# Patient Record
Sex: MALE | Race: WHITE | NOT HISPANIC OR LATINO | Employment: FULL TIME | URBAN - NONMETROPOLITAN AREA
[De-identification: names, ages, dates, MRNs, and addresses within clinical notes are randomized per-mention and may not be internally consistent; named-entity substitution may affect disease eponyms.]

---

## 2018-07-25 ENCOUNTER — OFFICE VISIT (OUTPATIENT)
Dept: OTOLARYNGOLOGY | Facility: CLINIC | Age: 33
End: 2018-07-25

## 2018-07-25 VITALS — WEIGHT: 159 LBS | TEMPERATURE: 96.4 F | BODY MASS INDEX: 22.26 KG/M2 | HEIGHT: 71 IN

## 2018-07-25 DIAGNOSIS — R22.1 MASS OF RIGHT SIDE OF NECK: Primary | ICD-10-CM

## 2018-07-25 DIAGNOSIS — J37.0 CHRONIC LARYNGITIS: ICD-10-CM

## 2018-07-25 PROCEDURE — 31575 DIAGNOSTIC LARYNGOSCOPY: CPT | Performed by: OTOLARYNGOLOGY

## 2018-07-25 PROCEDURE — 99243 OFF/OP CNSLTJ NEW/EST LOW 30: CPT | Performed by: OTOLARYNGOLOGY

## 2018-07-25 RX ORDER — CHLORTHALIDONE 25 MG/1
25 TABLET ORAL
COMMUNITY
Start: 2017-08-01 | End: 2018-08-02

## 2018-07-25 RX ORDER — LISINOPRIL 5 MG/1
5 TABLET ORAL
COMMUNITY
Start: 2017-08-14 | End: 2018-08-15

## 2018-07-25 NOTE — PROGRESS NOTES
"Subjective   Regan Arthur is a 33 y.o. male.  This is a consultation from Dr. Dwight Lemus     History of Present Illness   Patient reports he has felt a mass in his neckon the right side for at least 4 months.  Nothing in particular brought it on.  He cannot recall any inciting events such as an upper respiratory infection or dental infection.  He states that the mass has increased somewhat in size since he originally noticed it.  It is not painful.  No dysphagia.  No voice change.  No hemoptysis.  He has never had anything like this before.  Denies fevers, chills, night sweats, or unexpected weight loss.  Has never been a tobacco user.  Underwent ultrasounds at [facility] first on 4/26/18 which revealed a 1.9 x 1.4 x 1.0 cm mass and a second 1.1 cm in greatest diameter mass.  Repeat ultrasound performed on 6/15/18 showed resolution of the smaller mass and reportedly \"the larger of the 2 lymph nodes is about the same size as earlier\"       The following portions of the patient's history were reviewed and updated as appropriate: allergies, current medications, past family history, past medical history, past social history, past surgical history and problem list.      Regan Arthur reports that he has never smoked. He has never used smokeless tobacco.  Patient is not a tobacco user and has not been counseled for use of tobacco products    Family History   Problem Relation Age of Onset   • Cancer Mother        No Known Allergies      Current Outpatient Prescriptions:   •  chlorthalidone (HYGROTON) 25 MG tablet, Take 25 mg by mouth., Disp: , Rfl:   •  lisinopril (PRINIVIL,ZESTRIL) 5 MG tablet, Take 5 mg by mouth., Disp: , Rfl:     Past Medical History:   Diagnosis Date   • High blood pressure          Review of Systems   Constitutional: Negative for chills, fever and unexpected weight change.   HENT: Negative for trouble swallowing.    Respiratory: Negative for cough.            Objective   Physical " Exam  General: Well-developed well-nourished male in no acute distress.  Alert and oriented ×3. Head: Normocephalic. Face: Symmetrical strength and appearance. PERRL. EOMI. Voice:Strong. Speech:Fluent  Ears: External ears no deformity, canals show cerumen impactions that is not addressed today.  Tympanic membranes are not visualized  Nose: Nares show no discharge mass polyp or purulence.  Boggy mucosa is present.  No gross external deformity.  Septum: To the right  Oral cavity: Lips and gums without lesions.  Tongue and floor of mouth without lesions.  Parotid and submandibular ducts unobstructed.  No mucosal lesions on the buccal mucosa or vestibule of the mouth.  Pharynx: No erythema exudate mass or ulcer.  Tonsils appear atrophic  Neck: Mass in the right upper neck at the angle of the mandible that is well circumscribed measuring 2.3 cm in greatest diameter.  It is mobile and not fixed.  Slightly rubbery consistency..  No thyromegaly.  Trachea and larynx midline.  No masses in the parotid or submandibular glands.    Procedure Note    Pre-operative Diagnosis: Patient presents with:  Swollen Glands: right neck swollen gland       Post-operative Diagnosis: Chronic laryngitis    Anesthesia: topical with xylocaine and neosynephrine    Endoscopy Type:  Flexible Laryngoscopy    Procedure Details:    The patient was placed in the sitting position.  After topical anesthesia and decongestion, the 4 mm laryngoscope was passed.  The nasal cavities, nasopharynx, oropharynx, hypopharynx, and larynx were all examined.  Vocal cords were examined during respiration and phonation.  The following findings were noted:    Findings: Previously noted nasal findings were confirmed. Nasopharynx without mass, hypopharynx and larynx without evidence of neoplasm. Vocal cord mobility intact. There is chronic appearing edema and erythema of the laryngeal structures consistent with chronic laryngitis.    Condition:  Stable.  Patient tolerated  procedure well.    Complications:  NoneProcedure Note    Pre-operative Diagnosis: Patient presents with:  Swollen Glands: right neck swollen gland       Post-operative Diagnosis: same    Anesthesia: topical with xylocaine and neosynephrine    Endoscopy Type:  Flexible Laryngoscopy    Procedure Details:    The patient was placed in the sitting position.  After topical anesthesia and decongestion, the 4 mm laryngoscope was passed.  The nasal cavities, nasopharynx, oropharynx, hypopharynx, and larynx were all examined.  Vocal cords were examined during respiration and phonation.  The following findings were noted:    Findings: Previously noted nasal findings were confirmed. Nasopharynx without mass, hypopharynx and larynx without evidence of neoplasm. Vocal cord mobility intact. There is chronic appearing edema and erythema of the laryngeal structures consistent with chronic laryngitis.    Condition:  Stable.  Patient tolerated procedure well.    Complications:  None    Assessment/Plan   Regan was seen today for swollen glands.    Diagnoses and all orders for this visit:    Mass of right side of neck    Chronic laryngitis       plan: We will obtain CT scan of the neck to further delineate the anatomy of the mass and its association to the surrounding structures in particular I'm interested to see if this could possibly be arising out of the inferior aspect of the parotid.  We will consider obtaining fine-needle aspiration biopsy upon return after the scan.    My thanks to Dr. Lemus for this consultation

## 2018-08-06 ENCOUNTER — HOSPITAL ENCOUNTER (OUTPATIENT)
Dept: CT IMAGING | Facility: HOSPITAL | Age: 33
Discharge: HOME | End: 2018-08-06
Admitting: OTOLARYNGOLOGY

## 2018-08-06 ENCOUNTER — OFFICE VISIT (OUTPATIENT)
Dept: OTOLARYNGOLOGY | Facility: CLINIC | Age: 33
End: 2018-08-06

## 2018-08-06 VITALS — OXYGEN SATURATION: 98 % | HEART RATE: 72 BPM | WEIGHT: 159 LBS | HEIGHT: 71 IN | BODY MASS INDEX: 22.26 KG/M2

## 2018-08-06 DIAGNOSIS — R22.1 NECK MASS: Primary | ICD-10-CM

## 2018-08-06 DIAGNOSIS — K11.8 PAROTID MASS: ICD-10-CM

## 2018-08-06 DIAGNOSIS — R22.1 MASS OF RIGHT SIDE OF NECK: ICD-10-CM

## 2018-08-06 PROCEDURE — 70491 CT SOFT TISSUE NECK W/DYE: CPT

## 2018-08-06 PROCEDURE — 99212 OFFICE O/P EST SF 10 MIN: CPT | Performed by: OTOLARYNGOLOGY

## 2018-08-06 PROCEDURE — 88173 CYTOPATH EVAL FNA REPORT: CPT | Performed by: PATHOLOGY

## 2018-08-06 PROCEDURE — 88173 CYTOPATH EVAL FNA REPORT: CPT | Performed by: OTOLARYNGOLOGY

## 2018-08-06 PROCEDURE — 25010000002 IOPAMIDOL 61 % SOLUTION: Performed by: OTOLARYNGOLOGY

## 2018-08-06 PROCEDURE — 10021 FNA BX W/O IMG GDN 1ST LES: CPT | Performed by: OTOLARYNGOLOGY

## 2018-08-06 PROCEDURE — 88305 TISSUE EXAM BY PATHOLOGIST: CPT | Performed by: OTOLARYNGOLOGY

## 2018-08-06 RX ADMIN — IOPAMIDOL 75 ML: 612 INJECTION, SOLUTION INTRAVENOUS at 14:09

## 2018-08-06 NOTE — PROGRESS NOTES
Subjective   Regan Arthur is a 33 y.o. male.       History of Present Illness   Patient was seen previously with a right-sided neck mass.  Returns today having undergone a CT scan with contrast.  This is personally reviewed.  This shows a mass that is heterogeneous in the right Nasra parotid.  Appears well circumscribed.  Patient reports no change since I saw him last.      The following portions of the patient's history were reviewed and updated as appropriate: allergies, current medications, past family history, past medical history, past social history, past surgical history and problem list.     reports that he has never smoked. He has never used smokeless tobacco.   Patient is not a tobacco user and has not been counseled for use of tobacco products      Review of Systems        Objective   Physical Exam  2.3 cm mass in the right tail of the parotid, firm, well circumscribed      Assessment/Plan   Regan was seen today for follow-up.    Diagnoses and all orders for this visit:    Neck mass  -     Fine Needle Aspiration; Future  -     Fine Needle Aspiration    Parotid mass      Plan: Recommended fine-needle aspiration biopsy today.  Explain the nature of the procedure to the patient including risks of bleeding/bruising and possible nondiagnostic result versus the benefit of additional diagnostic information and the alternative of observation.  Patient was agreeable and with cytology staff present and the room fine-needle aspiration was carried out of the right parotid mass.  Patient will call for results and will discuss further treatment options depending on results.

## 2018-08-07 LAB
LAB AP CASE REPORT: NORMAL
LAB AP DIAGNOSIS COMMENT: NORMAL
LAB AP NON-GYN SPECIMEN ADEQUACY: NORMAL
PATH REPORT.FINAL DX SPEC: NORMAL

## 2018-08-08 ENCOUNTER — TELEPHONE (OUTPATIENT)
Dept: OTOLARYNGOLOGY | Facility: CLINIC | Age: 33
End: 2018-08-08

## 2018-08-08 NOTE — TELEPHONE ENCOUNTER
----- Message from Jeff Quigley sent at 8/8/2018  1:03 PM CDT -----  Contact: [phone]  Mrs. Arthur called for the biopsy results for Nehemiah      Lydia Arthur [phone]  Patient had previously given me permission to discuss his biopsy results his wife.  Explained that the results were consistent with a benign growth in his parotid gland and that surgery was recommended treatment.  Explain that I would like to schedule an appointment to go over the potential surgery with him and if he agrees make arrangements to proceed.  She states that will be fine and that we should contact the patient directly to schedule the appointment.  This will be done tomorrow.

## 2018-10-24 ENCOUNTER — OFFICE VISIT (OUTPATIENT)
Dept: OTOLARYNGOLOGY | Facility: CLINIC | Age: 33
End: 2018-10-24

## 2018-10-24 VITALS — HEIGHT: 71 IN | WEIGHT: 157.4 LBS | BODY MASS INDEX: 22.04 KG/M2 | RESPIRATION RATE: 17 BRPM

## 2018-10-24 DIAGNOSIS — K11.8 MASS OF PAROTID GLAND: Primary | ICD-10-CM

## 2018-10-24 PROCEDURE — 99214 OFFICE O/P EST MOD 30 MIN: CPT | Performed by: OTOLARYNGOLOGY

## 2018-10-24 RX ORDER — LOSARTAN POTASSIUM AND HYDROCHLOROTHIAZIDE 12.5; 1 MG/1; MG/1
1 TABLET ORAL DAILY
COMMUNITY
Start: 2018-08-06 | End: 2019-08-07

## 2018-10-25 ENCOUNTER — PREP FOR SURGERY (OUTPATIENT)
Dept: OTHER | Facility: HOSPITAL | Age: 33
End: 2018-10-25

## 2018-10-25 DIAGNOSIS — K11.8 PAROTID MASS: Primary | ICD-10-CM

## 2018-10-25 NOTE — PROGRESS NOTES
Subjective   Regan Arthur is a 33 y.o. male.     History of Present Illness   Patient was seen previously with a mass in the right upper neck.  CT scan revealed that the mass was within the parotid gland.  Fine-needle aspiration biopsy was consistent with pleomorphic adenoma.  Surgery was recommended.  Patient is here today to discuss this further.  It's been over 2 months since he was last seen.  He says he thinks the mass may be slightly larger.  No facial weakness.      The following portions of the patient's history were reviewed and updated as appropriate: allergies, current medications, past family history, past medical history, past social history, past surgical history and problem list.      Regan Arthur reports that he has never smoked. He has never used smokeless tobacco.  Patient is not a tobacco user and has not been counseled for use of tobacco products    Family History   Problem Relation Age of Onset   • Cancer Mother        No Known Allergies      Current Outpatient Prescriptions:   •  losartan-hydrochlorothiazide (HYZAAR) 100-12.5 MG per tablet, Take 1 tablet by mouth., Disp: , Rfl:     Past Medical History:   Diagnosis Date   • High blood pressure          Review of Systems   Constitutional: Negative for fever.   Respiratory: Negative for cough.            Objective   Physical Exam  General: Well-developed well-nourished male in no acute distress.  Alert and oriented ×3. Head: Normocephalic. Face: Symmetrical strength and appearance.  Voice:Strong. Speech:Fluent  Ears: External ears no deformity, canals no discharge, tympanic membranes intact clear and mobile bilaterally.  Nose: Nares show no discharge mass polyp or purulence.  Boggy mucosa is present.  No gross external deformity.  Septum: Midline  Oral cavity: Lips and gums without lesions.  Tongue and floor of mouth without lesions.  Parotid and submandibular ducts unobstructed.  No mucosal lesions on the buccal mucosa or vestibule  of the mouth.  Pharynx: No erythema exudate mass or ulcer  Neck: No lymphadenopathy.  No thyromegaly.  Trachea and larynx midline.  Mass in the inferior aspect of the right parotid feels well-circumscribed measures approximately 2.5 cm.  No masses in the left parotid or either submandibular gland  Chest: Clear.  Heart: Regular.  Abdomen: Benign.        Assessment/Plan   Regan was seen today for follow-up.    Diagnoses and all orders for this visit:    Mass of parotid gland        Plan: Recommended right parotidectomy with facial nerve dissection and preservation.  I explained the nature of this procedure to the patient in layman's terms including need for general anesthetic and risks including bleeding, infection, poor healing, poor appearance, numbness (expected), possibility of damage to one or more branches the facial nerve which may be temporary or may be permanent and may require additional treatment at a later date.  I have also explained risk of salivary fistula, and the possibility of gustatory sweating.  Also explained the possible need for further treatment depending on final pathology and the possibility of recurrence.  Proposed benefit would be definitive diagnosis and hopefully definitive treatment.  The alternative would be observation.  Patient voices understanding of all the above and wishes to proceed.  This is scheduled.

## 2018-12-03 ENCOUNTER — APPOINTMENT (OUTPATIENT)
Dept: PREADMISSION TESTING | Facility: HOSPITAL | Age: 33
End: 2018-12-03

## 2018-12-03 VITALS
DIASTOLIC BLOOD PRESSURE: 78 MMHG | WEIGHT: 162 LBS | SYSTOLIC BLOOD PRESSURE: 102 MMHG | BODY MASS INDEX: 22.68 KG/M2 | HEART RATE: 65 BPM | RESPIRATION RATE: 12 BRPM | HEIGHT: 71 IN | OXYGEN SATURATION: 100 %

## 2018-12-03 LAB
ANION GAP SERPL CALCULATED.3IONS-SCNC: 11 MMOL/L (ref 5–15)
BUN BLD-MCNC: 13 MG/DL (ref 7–21)
BUN/CREAT SERPL: 14.1 (ref 7–25)
CALCIUM SPEC-SCNC: 8.9 MG/DL (ref 8.4–10.2)
CHLORIDE SERPL-SCNC: 101 MMOL/L (ref 95–110)
CO2 SERPL-SCNC: 26 MMOL/L (ref 22–31)
CREAT BLD-MCNC: 0.92 MG/DL (ref 0.7–1.3)
GFR SERPL CREATININE-BSD FRML MDRD: 94 ML/MIN/1.73 (ref 70–162)
GLUCOSE BLD-MCNC: 65 MG/DL (ref 60–100)
POTASSIUM BLD-SCNC: 4.1 MMOL/L (ref 3.5–5.1)
SODIUM BLD-SCNC: 138 MMOL/L (ref 137–145)

## 2018-12-03 PROCEDURE — 93010 ELECTROCARDIOGRAM REPORT: CPT | Performed by: INTERNAL MEDICINE

## 2018-12-03 PROCEDURE — 36415 COLL VENOUS BLD VENIPUNCTURE: CPT

## 2018-12-03 PROCEDURE — 93005 ELECTROCARDIOGRAM TRACING: CPT

## 2018-12-03 PROCEDURE — 80048 BASIC METABOLIC PNL TOTAL CA: CPT | Performed by: ANESTHESIOLOGY

## 2018-12-03 RX ORDER — SODIUM CHLORIDE, SODIUM GLUCONATE, SODIUM ACETATE, POTASSIUM CHLORIDE, AND MAGNESIUM CHLORIDE 526; 502; 368; 37; 30 MG/100ML; MG/100ML; MG/100ML; MG/100ML; MG/100ML
1000 INJECTION, SOLUTION INTRAVENOUS CONTINUOUS
Status: CANCELLED | OUTPATIENT
Start: 2018-12-10

## 2018-12-03 NOTE — DISCHARGE INSTRUCTIONS
[facility]  Pre-op Information and Guidelines    You will be called after 2 p.m. the day before your surgery (Friday for Monday surgery) and notified of your time for arrival and approximate surgery time.  If you have not received a call by 4P.M., please contact Same Day Surgery at [phone] of if outside [facility] call [phone].    Please Follow these Important Safety Guidelines:    • The morning of your procedure, take only the medications listed below with   A sip of water:_____________________________________________       ______________________________________________    • DO NOT eat or drink anything after 12:00 midnight the night before surgery  Specific instructions concerning drinking clear liquids will be discussed during  the pre-surgery instruction call the day before your surgery.    • If you take a blood thinner (ex. Plavix, Coumadin, aspirin), ask your doctor when to stop it before surgery  STOP DATE: _________________    • Only 2 visitors are allowed in patient rooms at a time  Your visitors will be asked to wait in the lobby until the admission process is complete with the exception of a parent with a child and patients in need of special assistance.    • YOU CANNOT DRIVE YOURSELF HOME  You must be accompanied by someone who will be responsible for driving you home after surgery and for your care at home.    • DO NOT chew gum, use breath mints, hard candy, or smoke the day of surgery  • DO NOT drink alcohol for at least 24 hours before your surgery  • DO NOT wear any jewelry and remove all body piercing before coming to the hospital  • DO NOT wear make-up to the hospital  • If you are having surgery on an extremity (arm/leg/foot) remove nail polish/artificial nails on the surgical side  • Clothing, glasses, contacts, dentures, and hairpieces must be removed before surgery  • Bathe the night before or the morning of your surgery and do not use powders/lotions on  skin.

## 2018-12-09 ENCOUNTER — ANESTHESIA EVENT (OUTPATIENT)
Dept: PERIOP | Facility: HOSPITAL | Age: 33
End: 2018-12-09

## 2018-12-10 ENCOUNTER — ANESTHESIA (OUTPATIENT)
Dept: PERIOP | Facility: HOSPITAL | Age: 33
End: 2018-12-10

## 2018-12-10 ENCOUNTER — HOSPITAL ENCOUNTER (OUTPATIENT)
Facility: HOSPITAL | Age: 33
Setting detail: HOSPITAL OUTPATIENT SURGERY
Discharge: HOME | End: 2018-12-10
Attending: OTOLARYNGOLOGY | Admitting: OTOLARYNGOLOGY

## 2018-12-10 VITALS
BODY MASS INDEX: 21.94 KG/M2 | OXYGEN SATURATION: 100 % | SYSTOLIC BLOOD PRESSURE: 133 MMHG | DIASTOLIC BLOOD PRESSURE: 80 MMHG | WEIGHT: 156.75 LBS | HEART RATE: 102 BPM | RESPIRATION RATE: 18 BRPM | TEMPERATURE: 97.1 F | HEIGHT: 71 IN

## 2018-12-10 DIAGNOSIS — K11.8 PAROTID MASS: ICD-10-CM

## 2018-12-10 DIAGNOSIS — K11.8 MASS OF PAROTID GLAND: ICD-10-CM

## 2018-12-10 PROCEDURE — 25010000002 PROPOFOL 10 MG/ML EMULSION: Performed by: NURSE ANESTHETIST, CERTIFIED REGISTERED

## 2018-12-10 PROCEDURE — 42415 EXCISE PAROTID GLAND/LESION: CPT | Performed by: OTOLARYNGOLOGY

## 2018-12-10 PROCEDURE — 88307 TISSUE EXAM BY PATHOLOGIST: CPT | Performed by: PATHOLOGY

## 2018-12-10 PROCEDURE — 25010000002 MIDAZOLAM PER 1 MG: Performed by: NURSE ANESTHETIST, CERTIFIED REGISTERED

## 2018-12-10 PROCEDURE — 25010000002 SUCCINYLCHOLINE PER 20 MG: Performed by: NURSE ANESTHETIST, CERTIFIED REGISTERED

## 2018-12-10 PROCEDURE — 25010000002 HYDROMORPHONE PER 4 MG: Performed by: NURSE ANESTHETIST, CERTIFIED REGISTERED

## 2018-12-10 PROCEDURE — 25010000003 CEFUROXIME SODIUM 1.5 G RECONSTITUTED SOLUTION 1 EACH VIAL: Performed by: OTOLARYNGOLOGY

## 2018-12-10 PROCEDURE — 88331 PATH CONSLTJ SURG 1 BLK 1SPC: CPT | Performed by: OTOLARYNGOLOGY

## 2018-12-10 PROCEDURE — 88307 TISSUE EXAM BY PATHOLOGIST: CPT | Performed by: OTOLARYNGOLOGY

## 2018-12-10 PROCEDURE — 25010000002 PHENYLEPHRINE PER 1 ML: Performed by: NURSE ANESTHETIST, CERTIFIED REGISTERED

## 2018-12-10 PROCEDURE — 25010000002 ONDANSETRON PER 1 MG: Performed by: NURSE ANESTHETIST, CERTIFIED REGISTERED

## 2018-12-10 PROCEDURE — 25010000002 FENTANYL CITRATE (PF) 100 MCG/2ML SOLUTION: Performed by: NURSE ANESTHETIST, CERTIFIED REGISTERED

## 2018-12-10 PROCEDURE — 25010000003 CEFAZOLIN PER 500 MG: Performed by: OTOLARYNGOLOGY

## 2018-12-10 PROCEDURE — 25010000002 DEXAMETHASONE PER 1 MG: Performed by: NURSE ANESTHETIST, CERTIFIED REGISTERED

## 2018-12-10 PROCEDURE — 88331 PATH CONSLTJ SURG 1 BLK 1SPC: CPT | Performed by: PATHOLOGY

## 2018-12-10 RX ORDER — LIDOCAINE HYDROCHLORIDE 20 MG/ML
INJECTION, SOLUTION INFILTRATION; PERINEURAL AS NEEDED
Status: DISCONTINUED | OUTPATIENT
Start: 2018-12-10 | End: 2018-12-10 | Stop reason: SURG

## 2018-12-10 RX ORDER — SUCCINYLCHOLINE CHLORIDE 20 MG/ML
INJECTION INTRAMUSCULAR; INTRAVENOUS AS NEEDED
Status: DISCONTINUED | OUTPATIENT
Start: 2018-12-10 | End: 2018-12-10 | Stop reason: SURG

## 2018-12-10 RX ORDER — MEPERIDINE HYDROCHLORIDE 50 MG/ML
12.5 INJECTION INTRAMUSCULAR; INTRAVENOUS; SUBCUTANEOUS
Status: DISCONTINUED | OUTPATIENT
Start: 2018-12-10 | End: 2018-12-10 | Stop reason: HOSPADM

## 2018-12-10 RX ORDER — OXYCODONE HYDROCHLORIDE AND ACETAMINOPHEN 5; 325 MG/1; MG/1
1 TABLET ORAL ONCE AS NEEDED
Status: DISCONTINUED | OUTPATIENT
Start: 2018-12-10 | End: 2018-12-10 | Stop reason: HOSPADM

## 2018-12-10 RX ORDER — HYDROMORPHONE HCL 110MG/55ML
PATIENT CONTROLLED ANALGESIA SYRINGE INTRAVENOUS AS NEEDED
Status: DISCONTINUED | OUTPATIENT
Start: 2018-12-10 | End: 2018-12-10 | Stop reason: SURG

## 2018-12-10 RX ORDER — CEFUROXIME AXETIL 250 MG/1
250 TABLET ORAL 2 TIMES DAILY
Qty: 14 TABLET | Refills: 0 | Status: SHIPPED | OUTPATIENT
Start: 2018-12-10 | End: 2019-02-18

## 2018-12-10 RX ORDER — BACITRACIN ZINC 500 [USP'U]/G
OINTMENT TOPICAL AS NEEDED
Status: DISCONTINUED | OUTPATIENT
Start: 2018-12-10 | End: 2018-12-10 | Stop reason: HOSPADM

## 2018-12-10 RX ORDER — MIDAZOLAM HYDROCHLORIDE 1 MG/ML
INJECTION INTRAMUSCULAR; INTRAVENOUS AS NEEDED
Status: DISCONTINUED | OUTPATIENT
Start: 2018-12-10 | End: 2018-12-10 | Stop reason: SURG

## 2018-12-10 RX ORDER — CEFAZOLIN SODIUM 1 G/3ML
INJECTION, POWDER, FOR SOLUTION INTRAMUSCULAR; INTRAVENOUS AS NEEDED
Status: DISCONTINUED | OUTPATIENT
Start: 2018-12-10 | End: 2018-12-10 | Stop reason: HOSPADM

## 2018-12-10 RX ORDER — DEXAMETHASONE SODIUM PHOSPHATE 4 MG/ML
INJECTION, SOLUTION INTRA-ARTICULAR; INTRALESIONAL; INTRAMUSCULAR; INTRAVENOUS; SOFT TISSUE AS NEEDED
Status: DISCONTINUED | OUTPATIENT
Start: 2018-12-10 | End: 2018-12-10 | Stop reason: SURG

## 2018-12-10 RX ORDER — FENTANYL CITRATE 50 UG/ML
INJECTION, SOLUTION INTRAMUSCULAR; INTRAVENOUS AS NEEDED
Status: DISCONTINUED | OUTPATIENT
Start: 2018-12-10 | End: 2018-12-10 | Stop reason: SURG

## 2018-12-10 RX ORDER — ONDANSETRON 4 MG/1
4 TABLET, ORALLY DISINTEGRATING ORAL EVERY 8 HOURS PRN
Qty: 10 TABLET | Refills: 1 | Status: SHIPPED | OUTPATIENT
Start: 2018-12-10 | End: 2019-02-18

## 2018-12-10 RX ORDER — PROPOFOL 10 MG/ML
VIAL (ML) INTRAVENOUS AS NEEDED
Status: DISCONTINUED | OUTPATIENT
Start: 2018-12-10 | End: 2018-12-10 | Stop reason: SURG

## 2018-12-10 RX ORDER — 0.9 % SODIUM CHLORIDE 0.9 %
VIAL (ML) INJECTION AS NEEDED
Status: DISCONTINUED | OUTPATIENT
Start: 2018-12-10 | End: 2018-12-10 | Stop reason: HOSPADM

## 2018-12-10 RX ORDER — SODIUM CHLORIDE, SODIUM GLUCONATE, SODIUM ACETATE, POTASSIUM CHLORIDE, AND MAGNESIUM CHLORIDE 526; 502; 368; 37; 30 MG/100ML; MG/100ML; MG/100ML; MG/100ML; MG/100ML
1000 INJECTION, SOLUTION INTRAVENOUS CONTINUOUS
Status: DISCONTINUED | OUTPATIENT
Start: 2018-12-10 | End: 2018-12-10 | Stop reason: HOSPADM

## 2018-12-10 RX ORDER — OXYCODONE HYDROCHLORIDE AND ACETAMINOPHEN 5; 325 MG/1; MG/1
1-2 TABLET ORAL EVERY 4 HOURS PRN
Qty: 40 TABLET | Refills: 0 | Status: SHIPPED | OUTPATIENT
Start: 2018-12-10 | End: 2019-02-18

## 2018-12-10 RX ORDER — LIDOCAINE HYDROCHLORIDE AND EPINEPHRINE 10; 10 MG/ML; UG/ML
INJECTION, SOLUTION INFILTRATION; PERINEURAL AS NEEDED
Status: DISCONTINUED | OUTPATIENT
Start: 2018-12-10 | End: 2018-12-10 | Stop reason: HOSPADM

## 2018-12-10 RX ORDER — ONDANSETRON 2 MG/ML
INJECTION INTRAMUSCULAR; INTRAVENOUS AS NEEDED
Status: DISCONTINUED | OUTPATIENT
Start: 2018-12-10 | End: 2018-12-10 | Stop reason: SURG

## 2018-12-10 RX ADMIN — FENTANYL CITRATE 100 MCG: 50 INJECTION, SOLUTION INTRAMUSCULAR; INTRAVENOUS at 07:35

## 2018-12-10 RX ADMIN — SODIUM CHLORIDE, SODIUM GLUCONATE, SODIUM ACETATE, POTASSIUM CHLORIDE, AND MAGNESIUM CHLORIDE: 526; 502; 368; 37; 30 INJECTION, SOLUTION INTRAVENOUS at 09:36

## 2018-12-10 RX ADMIN — SODIUM CHLORIDE, SODIUM GLUCONATE, SODIUM ACETATE, POTASSIUM CHLORIDE, AND MAGNESIUM CHLORIDE: 526; 502; 368; 37; 30 INJECTION, SOLUTION INTRAVENOUS at 07:31

## 2018-12-10 RX ADMIN — MIDAZOLAM HYDROCHLORIDE 2 MG: 2 INJECTION, SOLUTION INTRAMUSCULAR; INTRAVENOUS at 07:32

## 2018-12-10 RX ADMIN — DEXAMETHASONE SODIUM PHOSPHATE 4 MG: 4 INJECTION, SOLUTION INTRAMUSCULAR; INTRAVENOUS at 07:39

## 2018-12-10 RX ADMIN — SODIUM CHLORIDE, SODIUM GLUCONATE, SODIUM ACETATE, POTASSIUM CHLORIDE, AND MAGNESIUM CHLORIDE 1000 ML: 526; 502; 368; 37; 30 INJECTION, SOLUTION INTRAVENOUS at 06:44

## 2018-12-10 RX ADMIN — PROPOFOL 200 MG: 10 INJECTION, EMULSION INTRAVENOUS at 07:35

## 2018-12-10 RX ADMIN — HYDROMORPHONE HYDROCHLORIDE 0.5 MG: 2 INJECTION INTRAMUSCULAR; INTRAVENOUS; SUBCUTANEOUS at 08:06

## 2018-12-10 RX ADMIN — SUCCINYLCHOLINE CHLORIDE 120 MG: 20 INJECTION, SOLUTION INTRAMUSCULAR; INTRAVENOUS at 07:35

## 2018-12-10 RX ADMIN — ONDANSETRON 4 MG: 2 INJECTION INTRAMUSCULAR; INTRAVENOUS at 10:44

## 2018-12-10 RX ADMIN — HYDROMORPHONE HYDROCHLORIDE 0.5 MG: 2 INJECTION INTRAMUSCULAR; INTRAVENOUS; SUBCUTANEOUS at 09:38

## 2018-12-10 RX ADMIN — SODIUM CHLORIDE 1.5 G: 9 INJECTION, SOLUTION INTRAVENOUS at 07:33

## 2018-12-10 RX ADMIN — PHENYLEPHRINE HYDROCHLORIDE 100 MCG: 10 INJECTION INTRAVENOUS at 08:18

## 2018-12-10 RX ADMIN — LIDOCAINE HYDROCHLORIDE 60 MG: 20 INJECTION, SOLUTION INFILTRATION; PERINEURAL at 07:35

## 2018-12-10 NOTE — H&P
Regan Arthur is a 33 y.o. male.      History of Present Illness   Patient was seen previously with a mass in the right upper neck.  CT scan revealed that the mass was within the parotid gland.  Fine-needle aspiration biopsy was consistent with pleomorphic adenoma.  Surgery was recommended.  Patient is here today to discuss this further.  It's been over 2 months since he was last seen.  He says he thinks the mass may be slightly larger.  No facial weakness.        The following portions of the patient's history were reviewed and updated as appropriate: allergies, current medications, past family history, past medical history, past social history, past surgical history and problem list.        Regan Arthur reports that he has never smoked. He has never used smokeless tobacco.  Patient is not a tobacco user and has not been counseled for use of tobacco products           Family History   Problem Relation Age of Onset   • Cancer Mother           No Known Allergies        Current Outpatient Prescriptions:   •  losartan-hydrochlorothiazide (HYZAAR) 100-12.5 MG per tablet, Take 1 tablet by mouth., Disp: , Rfl:      Medical History        Past Medical History:   Diagnosis Date   • High blood pressure                 Review of Systems   Constitutional: Negative for fever.   Respiratory: Negative for cough.                     Objective      Physical Exam  General: Well-developed well-nourished male in no acute distress.  Alert and oriented ×3. Head: Normocephalic. Face: Symmetrical strength and appearance.  Voice:Strong. Speech:Fluent  Ears: External ears no deformity, canals no discharge, tympanic membranes intact clear and mobile bilaterally.  Nose: Nares show no discharge mass polyp or purulence.  Boggy mucosa is present.  No gross external deformity.  Septum: Midline  Oral cavity: Lips and gums without lesions.  Tongue and floor of mouth without lesions.  Parotid and submandibular ducts unobstructed.  No  mucosal lesions on the buccal mucosa or vestibule of the mouth.  Pharynx: No erythema exudate mass or ulcer  Neck: No lymphadenopathy.  No thyromegaly.  Trachea and larynx midline.  Mass in the inferior aspect of the right parotid feels well-circumscribed measures approximately 2.5 cm.  No masses in the left parotid or either submandibular gland  Chest: Clear.  Heart: Regular.  Abdomen: Benign.                Assessment/Plan      Regan was seen today for follow-up.     Diagnoses and all orders for this visit:     Mass of parotid gland           Plan: Recommended right parotidectomy with facial nerve dissection and preservation.  I explained the nature of this procedure to the patient in layman's terms including need for general anesthetic and risks including bleeding, infection, poor healing, poor appearance, numbness (expected), possibility of damage to one or more branches the facial nerve which may be temporary or may be permanent and may require additional treatment at a later date.  I have also explained risk of salivary fistula, and the possibility of gustatory sweating.  Also explained the possible need for further treatment depending on final pathology and the possibility of recurrence.  Proposed benefit would be definitive diagnosis and hopefully definitive treatment.  The alternative would be observation.  Patient voices understanding of all the above and wishes to proceed.  This is scheduled.        Update 12/10/18: no change in exam or plan

## 2018-12-10 NOTE — ANESTHESIA POSTPROCEDURE EVALUATION
Patient: Regan Arthur    Procedure Summary     Date:  12/10/18 Room / Location:  [facility] OR  / [facility] OR    Anesthesia Start:  0731 Anesthesia Stop:  1139    Procedure:  PAROTIDECTOMY (Right Neck) Diagnosis:       Mass of parotid gland      (Mass of parotid gland [K11.9])    Surgeon:  Cortes Casper MD Provider:  Tim Degroot MD    Anesthesia Type:  general ASA Status:  2          Anesthesia Type: general  Last vitals  BP   124/70 (12/10/18 1130)   Temp   97.6 °F (36.4 °C) (12/10/18 1130)   Pulse   93 (12/10/18 1130)   Resp   14 (12/10/18 1130)     SpO2   95 % (12/10/18 1130)     Post Anesthesia Care and Evaluation    Patient location during evaluation: PACU  Patient participation: waiting for patient participation  Level of consciousness: obtunded/minimal responses  Pain management: adequate  Airway patency: oral airway.  Anesthetic complications: No anesthetic complications  PONV Status: none  Cardiovascular status: acceptable  Respiratory status: acceptable  Hydration status: acceptable

## 2018-12-10 NOTE — BRIEF OP NOTE
PAROTIDECTOMY  Progress Note    Regan Arthur  12/10/2018    Pre-op Diagnosis:   Mass of parotid gland [K11.9]       Post-Op Diagnosis Codes:     * Mass of parotid gland [K11.9]    Procedure/CPT® Codes:      Procedure(s):  PAROTIDECTOMY    Surgeon(s):  Corets Casper MD    Anesthesia: General    Staff:   Circulator: Rebecca Roman RN; Shaylee Pitts RN  Scrub Person: Kiya Williamson; Juliette Jean Baptiste  Assistant: Leilani Jimenez    Estimated Blood Loss: 75 mL    Urine Voided: 110 mL    Specimens:                ID Type Source Tests Collected by Time   A : RIGHT PAROTID Tissue Parotid TISSUE PATHOLOGY EXAM Cortes Casper MD 12/10/2018 1036         Drains:   Closed/Suction Drain 1 Right Neck  10 Fr. (Active)   Dressing Status Intact;Dry;Clean 12/10/2018 10:42 AM       Urethral Catheter Double-lumen 16 Fr. (Active)       Findings: Well circumscribed mass in the inferior aspect of the right parotid.  Facial nerve identified and preserved.    Complications:         Cortes Casper MD     Date: 12/10/2018  Time: 11:34 AM

## 2018-12-10 NOTE — OP NOTE
PREOPERATIVE DIAGNOSIS: Right parotid mass.    POSTOPERATIVE DIAGNOSIS: Pleomorphic adenoma, right parotid.    PROCEDURE PERFORMED: Right lateral lobe parotidectomy with facial nerve dissection and preservation.    SURGEON: Cortes Casper MD    ANESTHESIA: General endotracheal.    ESTIMATED BLOOD LOSS: Minimal.    FLUIDS: Crystalloid.    SPECIMENS: Right parotid (frozen section diagnosis consistent with benign pleomorphic adenoma).    COMPLICATIONS: None.    DRAINS: 10-[place] Hemovac x1.    INDICATIONS FOR PROCEDURE: A 33-year-old male with a right parotid mass. Fine needle aspiration was suggestive of pleomorphic adenoma. Surgical resection was recommended for diagnostic and therapeutic purposes.    FINDINGS: Well-circumscribed mass in the inferior aspect of the right parotid. Facial nerve was identified and the frontal, zygomatic, buccal, and marginal mandibular branches were all identified and preserved.    DESCRIPTION OF PROCEDURE: Patient was taken to the operating room and placed in the supine position. After the satisfactory induction of general endotracheal anesthesia, the head of the bed was turned and the operative site was prepped by using clippers to trim some of the patient's facial hair in the preauricular area and the upper neck. A modified Lui incision was designed and infiltrated with 1% Xylocaine with epinephrine. The right face and upper neck were then prepped and draped sterilely. Draping was carried out such that the lateral forehead, corner of the eye, and corner of the mouth could all be seen. Incision was made and carried down through the subcutaneous tissue sharply. A facelift-type flap was elevated anteriorly. Flap was held in retraction using 2-0 silk sutures attached to the drapes with clamps. A 3rd retraction suture was used to retract the lobule posteriorly. Dissection was then carried out from superficial to deep along a wide area from the anterior border of the sternocleidomastoid  muscle to the root of the zygoma. As dissection proceeded from superficial to deep, the facial nerve was eventually identified in its usual anatomic position. The main trunk was identified. The nerve branched almost immediately into upper and lower divisions. With the nerve identified, the upper and lower divisions were followed distally, dividing tissue between them and dividing tissue laterally. Eventually, the frontal, zygomatic, buccal, and marginal mandibular branches were all identified. Each of these was followed distally. Intervening tissue between the branches was divided under direct visualization. Eventually, the overlying tissue at the periphery of the parotid was divided mobilizing the superior aspect of the lateral lobe first and then eventually the inferior aspect. The marginal mandibular branch of the facial nerve was noted to pass deep to the palpable tumor. The lateral lobe was able to be elevated off the marginal mandibular branch without disturbing the capsule of the mass. When the final anterior attachments were divided, the lateral lobe of the parotid was sent to pathology for frozen section. The wound was copiously irrigated with saline to which Kefzol had been added. The main trunk of the facial nerve was stimulated with a nerve stimulator and there was noted to be movement in the frontal, zygomatic, buccal, and marginal mandibular branches. Frozen section returned benign pleomorphic adenoma. A 10-Polish silastic drain was brought out inferiorly through a separate stab incision. The drain was placed in the bed of the wound but not directly over the facial neve. The stay sutures were trimmed and removed and the flap was laid back into place. Subcutaneous closure was accomplished with interrupted 4-0 Vicryl and 5-0 Vicryl. Drain was placed to suction and noted to hold seal. The skin anterior to the ear was closed with interrupted 5-0 nylon. The neck portion of the incision was closed with  staples. Bacitracin ointment, Xeroform gauze, fluff gauze, and Kerlix wrap were used to create a dressing. Procedure was terminated. Patient tolerated procedure well, went to recovery room in satisfactory condition.

## 2018-12-10 NOTE — ANESTHESIA PROCEDURE NOTES
ANESTHESIA INTUBATION  Urgency: elective    Airway not difficult    General Information and Staff    Patient location during procedure: OR  CRNA: Aris Barbosa CRNA    Indications and Patient Condition  Indications for airway management: airway protection    Preoxygenated: yes  Mask difficulty assessment: 0 - not attempted    Final Airway Details  Final airway type: endotracheal airway      Successful airway: ETT  Cuffed: yes   Successful intubation technique: video laryngoscopy and RSI  Facilitating devices/methods: intubating stylet  Blade: Alfonso  Blade size: 4  ETT size (mm): 7.5  Cormack-Lehane Classification: grade IIa - partial view of glottis  Placement verified by: chest auscultation and capnometry   Measured from: teeth  ETT to teeth (cm): 20  Number of attempts at approach: 2    Additional Comments  JHUG SRNA: DL one attempt, then VLS.

## 2018-12-10 NOTE — ANESTHESIA PREPROCEDURE EVALUATION
Anesthesia Evaluation     Patient summary reviewed   NPO Solid Status: > 8 hours  NPO Liquid Status: > 8 hours           Airway   Mallampati: II  TM distance: <3 FB  Neck ROM: full  Possible difficult intubation, Small opening and Anterior  Dental    (+) poor dentition    Pulmonary - normal exam   (+) a smoker Former,   Cardiovascular - normal exam    (+) hypertension,       Neuro/Psych  (+) psychiatric history Anxiety,     GI/Hepatic/Renal/Endo    (+)  GERD well controlled,      Musculoskeletal     Abdominal    Substance History      OB/GYN          Other            Phys Exam Other: Right parotid gland noted on exam.                Anesthesia Plan    ASA 2     general     intravenous induction   Anesthetic plan, all risks, benefits, and alternatives have been provided, discussed and informed consent has been obtained with: patient.

## 2018-12-11 ENCOUNTER — OFFICE VISIT (OUTPATIENT)
Dept: OTOLARYNGOLOGY | Facility: CLINIC | Age: 33
End: 2018-12-11

## 2018-12-11 VITALS — HEIGHT: 71 IN | WEIGHT: 156 LBS | OXYGEN SATURATION: 99 % | BODY MASS INDEX: 21.84 KG/M2

## 2018-12-11 DIAGNOSIS — Z48.3 AFTERCARE FOLLOWING SURGERY FOR NEOPLASM: Primary | ICD-10-CM

## 2018-12-11 LAB
LAB AP CASE REPORT: NORMAL
Lab: NORMAL
PATH REPORT.FINAL DX SPEC: NORMAL
PATH REPORT.GROSS SPEC: NORMAL

## 2018-12-11 PROCEDURE — 99024 POSTOP FOLLOW-UP VISIT: CPT | Performed by: OTOLARYNGOLOGY

## 2018-12-12 ENCOUNTER — TELEPHONE (OUTPATIENT)
Dept: OTOLARYNGOLOGY | Facility: CLINIC | Age: 33
End: 2018-12-12

## 2018-12-12 ENCOUNTER — OFFICE VISIT (OUTPATIENT)
Dept: OTOLARYNGOLOGY | Facility: CLINIC | Age: 33
End: 2018-12-12

## 2018-12-12 VITALS — WEIGHT: 156 LBS | BODY MASS INDEX: 21.84 KG/M2 | RESPIRATION RATE: 17 BRPM | HEIGHT: 71 IN

## 2018-12-12 DIAGNOSIS — Z48.3 AFTERCARE FOLLOWING SURGERY FOR NEOPLASM: ICD-10-CM

## 2018-12-12 DIAGNOSIS — D11.0 PLEOMORPHIC ADENOMA OF PAROTID GLAND: Primary | ICD-10-CM

## 2018-12-12 PROCEDURE — 99024 POSTOP FOLLOW-UP VISIT: CPT | Performed by: OTOLARYNGOLOGY

## 2018-12-12 NOTE — TELEPHONE ENCOUNTER
Message relayed patient to come to appointment today at 4.    ----- Message from Cortes Casper MD sent at 12/12/2018  2:25 PM CST -----  Contact: [phone]  Tell he no, do not take it apart. See if they can come in this afternoon for me to look at it  ----- Message -----  From: Jeff Quigley  Sent: 12/12/2018  12:18 PM  To: Cortes Casper MD, Jonna Javed, #    Called to say that drainage line going into the accordin is clogged up and Mrs. Arthur wants to know if she can take it apart and clean it. Noticed around 11:00 PM last night it was clogged up and all drainage is in tube.       Lydia [phone]

## 2018-12-12 NOTE — PROGRESS NOTES
Subjective   Regan Arthur is a 33 y.o. male.       History of Present Illness   Patient is now 2 days status post right parotidectomy.  Patient called to be seen because he was concerned that his drain was occluded.  No fever.      The following portions of the patient's history were reviewed and updated as appropriate: allergies, current medications, past family history, past medical history, past social history, past surgical history and problem list.     reports that  has never smoked. he has never used smokeless tobacco. He reports that he drinks alcohol. He reports that he does not use drugs.   Patient is not a tobacco user and has not been counseled for use of tobacco products      Review of Systems        Objective   Physical Exam  Incision intact.  No evidence of swelling or fluid collection beneath the flap.  Drain is in place.  It appears to be functioning but there is a clot where the drainage tube enters the drain reservoir.  The drain is therefore clamped and the reservoir is disconnected and this clot is able to be removed with suction without difficulty.  The drain is then reattached and unclamped and appears to be functioning normally.      Assessment/Plan   Regan was seen today for post-op.    Diagnoses and all orders for this visit:    Pleomorphic adenoma of parotid gland    Aftercare following surgery for neoplasm      Plan: Treatment as described above along with reassurance.  Keep scheduled appointment Monday.

## 2018-12-14 NOTE — PROGRESS NOTES
Subjective   Regan Arthur is a 33 y.o. male.       History of Present Illness   Patient is 1 day status post right parotidectomy.  Intraoperative frozen section was consistent with pleomorphic adenoma.  Patient is here for dressing removal.  Is having no specific complaints.      The following portions of the patient's history were reviewed and updated as appropriate: allergies, current medications, past family history, past medical history, past social history, past surgical history and problem list.     reports that  has never smoked. he has never used smokeless tobacco. He reports that he drinks alcohol. He reports that he does not use drugs.   Patient is not a tobacco user and has not been counseled for use of tobacco products      Review of Systems        Objective   Physical Exam  Dressing is in place.  This is trimmed and removed.  The incision is intact with no evidence of fluid collection beneath the flap.  Drain is in place and appears to be working.  Facial nerve function is intact.      Assessment/Plan   Regan was seen today for post-op.    Diagnoses and all orders for this visit:    Aftercare following surgery for neoplasm      Plan: Begin local wound care to incision and drain site, clean with soap and water and apply bacitracin ointment twice a day.  Return 12/17/18 for suture and drain removal.

## 2018-12-17 ENCOUNTER — OFFICE VISIT (OUTPATIENT)
Dept: OTOLARYNGOLOGY | Facility: CLINIC | Age: 33
End: 2018-12-17

## 2018-12-17 DIAGNOSIS — Z48.3 AFTERCARE FOLLOWING SURGERY FOR NEOPLASM: Primary | ICD-10-CM

## 2018-12-17 DIAGNOSIS — D11.0 PLEOMORPHIC ADENOMA OF PAROTID GLAND: ICD-10-CM

## 2018-12-17 PROCEDURE — 99024 POSTOP FOLLOW-UP VISIT: CPT | Performed by: OTOLARYNGOLOGY

## 2018-12-17 NOTE — PROGRESS NOTES
Subjective   Regan Arthur is a 33 y.o. male.       History of Present Illness     Patient is now 7 days status post right parotidectomy.  Is having no specific problems.  Drain output is been minimal.    The following portions of the patient's history were reviewed and updated as appropriate: allergies, current medications, past family history, past medical history, past social history, past surgical history and problem list.     reports that  has never smoked. he has never used smokeless tobacco. He reports that he drinks alcohol. He reports that he does not use drugs.   Patient is not a tobacco user and has not been counseled for use of tobacco products      Review of Systems        Objective   Physical Exam  Incision intact.  No evidence of fluid collection beneath the flap.  Facial nerve function is intact.  Drain stitches trimmed and removed.  Drain is removed from suction and removed.  Sutures and staples are removed from the skin.      Assessment/Plan   Regan was seen today for post-op.    Diagnoses and all orders for this visit:    Aftercare following surgery for neoplasm    Pleomorphic adenoma of parotid gland      Plan: Drain, sutures, staples removed as described above.  May resume unrestricted activity as of 12/26/18.  Advised applying bacitracin ointment to drain site for the next few days until it heals.  See me again in 6 weeks, call sooner for problems.

## 2019-02-18 ENCOUNTER — OFFICE VISIT (OUTPATIENT)
Dept: OTOLARYNGOLOGY | Facility: CLINIC | Age: 34
End: 2019-02-18

## 2019-02-18 VITALS — BODY MASS INDEX: 23.13 KG/M2 | WEIGHT: 165.2 LBS | HEIGHT: 71 IN

## 2019-02-18 DIAGNOSIS — Z48.3 AFTERCARE FOLLOWING SURGERY FOR NEOPLASM: Primary | ICD-10-CM

## 2019-02-18 PROCEDURE — 99024 POSTOP FOLLOW-UP VISIT: CPT | Performed by: OTOLARYNGOLOGY

## 2019-02-18 NOTE — PROGRESS NOTES
Subjective   Regan Arthur is a 33 y.o. male.       History of Present Illness   Patient is status post right parotidectomy for what turned out to be pleomorphic adenoma.  Surgery was over 2 months ago.  He is having no issues.  States he feels like it is well-healed.  No trouble eating, speaking, swallowing      The following portions of the patient's history were reviewed and updated as appropriate: allergies, current medications, past family history, past medical history, past social history, past surgical history and problem list.     reports that  has never smoked. he has never used smokeless tobacco. He reports that he drinks alcohol. He reports that he does not use drugs.   Patient is not a tobacco user and has not been counseled for use of tobacco products      Review of Systems        Objective   Physical Exam  Incision intact with no evidence of infection.  No mass or fluid collection in the parotid bed.  Facial nerve function is intact in all branches.      Assessment/Plan   Regan was seen today for follow-up.    Diagnoses and all orders for this visit:    Aftercare following surgery for neoplasm        Plan: No further treatment needed from my standpoint.  May follow-up with me as needed.

## 2021-12-06 PROCEDURE — 87635 SARS-COV-2 COVID-19 AMP PRB: CPT | Performed by: FAMILY MEDICINE

## 2021-12-10 PROCEDURE — 87635 SARS-COV-2 COVID-19 AMP PRB: CPT | Performed by: EMERGENCY MEDICINE

## (undated) DEVICE — PENCL E/S HNDSWCH PUSHBTN HOLSTR 10FT

## (undated) DEVICE — ANTIBACTERIAL UNDYED BRAIDED (POLYGLACTIN 910), SYNTHETIC ABSORBABLE SUTURE: Brand: COATED VICRYL

## (undated) DEVICE — TRY IRR

## (undated) DEVICE — Device

## (undated) DEVICE — NDL HYPO ECLPS SFTY 25G 1 1/2IN

## (undated) DEVICE — SUT VIC 5/0 P3 18IN UD VCP493G

## (undated) DEVICE — SPNG LAP 18X18IN LF STRL PK/5

## (undated) DEVICE — MARKR SKIN W/RULR AND LBL

## (undated) DEVICE — SUT SILK 4/0 18IN SA63H

## (undated) DEVICE — POSTN HD RING CUSH 9IN LF

## (undated) DEVICE — STERILE POLYISOPRENE POWDER-FREE SURGICAL GLOVES WITH EMOLLIENT COATING: Brand: PROTEXIS

## (undated) DEVICE — ELECTRD BLD EZ CLN MOD 2.5IN

## (undated) DEVICE — GLV SURG SENSICARE POLYISPRN W/ALOE PF LF 6.5 GRN STRL

## (undated) DEVICE — SUT SILK B SUTUPK 2/0 18IN SA65H

## (undated) DEVICE — SUT SILK 0 FSL 18IN 678G

## (undated) DEVICE — GLV SURG SENSICARE POLYISPRN W/ALOE PF LF 6 GRN STRL

## (undated) DEVICE — SPNG DISSCT SECTO KTTNER PK/5

## (undated) DEVICE — SUT SILK 3-0 TIES 18IN SA64H

## (undated) DEVICE — GOWN,AURORA,NOREINF,RAGLAN,XL,STERILE: Brand: MEDLINE

## (undated) DEVICE — SUT SILK 0 TIES 18IN A186H BX36

## (undated) DEVICE — SUT SILK 2/0 FS BLK 18IN 685G

## (undated) DEVICE — SYR LL TP 10ML STRL

## (undated) DEVICE — 3M™ WARMING BLANKET, LOWER BODY, 10 PER CASE, 42568: Brand: BAIR HUGGER™

## (undated) DEVICE — PREP PVP-I 7.5P BT 4OZ

## (undated) DEVICE — GLV SURG TRIUMPH LT PF LTX 8 STRL

## (undated) DEVICE — SUT ETHLN 5/0 P3 18IN 698H

## (undated) DEVICE — SPNG GZ WOVN 4X4IN 12PLY 10/BX STRL

## (undated) DEVICE — PK ENT LF 60

## (undated) DEVICE — TOTAL TRAY, 16FR 10ML SIL FOLEY, URN: Brand: MEDLINE

## (undated) DEVICE — GLV SURG TRIUMPH ORTHO W/ALOE PF LTX 6.5 STRL

## (undated) DEVICE — PREP SOL POVIDONE/IODINE BT 4OZ

## (undated) DEVICE — GLV SURG SENSICARE PI PF LF 7 GRN STRL

## (undated) DEVICE — PAD GRND REM POLYHESIVE A/ DISP

## (undated) DEVICE — DRSNG GZ CURAD XEROFORM NONADHS 5X9IN STRL

## (undated) DEVICE — NDL HYPO PRECISIONGLIDE/REG 18G 1IN PNK

## (undated) DEVICE — SOL IRR NACL 0.9PCT BT 1000ML

## (undated) DEVICE — GAUZE,SPONGE,FLUFF,6"X6.75",STRL,5/TRAY: Brand: MEDLINE

## (undated) DEVICE — STIMULATOR 8562010 VARI-STIM 10PK ROHS: Brand: VARI-STIM®

## (undated) DEVICE — STPLR SKIN VISISTAT WD 35CT

## (undated) DEVICE — GAUZE,SPONGE,4"X4",16PLY,XRAY,STRL,LF: Brand: MEDLINE

## (undated) DEVICE — BANDAGE,GAUZE,BULKEE II,4.5"X4.1YD,STRL: Brand: MEDLINE